# Patient Record
Sex: FEMALE | Race: OTHER | Employment: UNEMPLOYED | URBAN - METROPOLITAN AREA
[De-identification: names, ages, dates, MRNs, and addresses within clinical notes are randomized per-mention and may not be internally consistent; named-entity substitution may affect disease eponyms.]

---

## 2022-08-16 ENCOUNTER — HOSPITAL ENCOUNTER (EMERGENCY)
Age: 42
Discharge: HOME | End: 2022-08-16

## 2022-08-16 ENCOUNTER — APPOINTMENT (OUTPATIENT)
Dept: GENERAL RADIOLOGY | Age: 42
End: 2022-08-16

## 2022-08-16 VITALS
HEART RATE: 87 BPM | SYSTOLIC BLOOD PRESSURE: 129 MMHG | TEMPERATURE: 98.3 F | DIASTOLIC BLOOD PRESSURE: 84 MMHG | OXYGEN SATURATION: 97 % | RESPIRATION RATE: 18 BRPM

## 2022-08-16 DIAGNOSIS — Z48.02 ENCOUNTER FOR STAPLE REMOVAL: ICD-10-CM

## 2022-08-16 DIAGNOSIS — S82.90XA CLOSED FRACTURE OF LOWER EXTREMITY, UNSPECIFIED LATERALITY, INITIAL ENCOUNTER: ICD-10-CM

## 2022-08-16 DIAGNOSIS — Z48.02 ENCOUNTER FOR REMOVAL OF SUTURES: ICD-10-CM

## 2022-08-16 DIAGNOSIS — Z46.89 ENCOUNTER FOR CAST REMOVAL: Primary | ICD-10-CM

## 2022-08-16 PROCEDURE — 99283 EMERGENCY DEPT VISIT LOW MDM: CPT

## 2022-08-16 PROCEDURE — 73590 X-RAY EXAM OF LOWER LEG: CPT

## 2022-08-16 ASSESSMENT — ENCOUNTER SYMPTOMS
COLOR CHANGE: 0
DIARRHEA: 0
BACK PAIN: 0
SHORTNESS OF BREATH: 0
CONSTIPATION: 0
COUGH: 0
ABDOMINAL PAIN: 0
STRIDOR: 0
WHEEZING: 0
NAUSEA: 0
VOMITING: 0

## 2022-08-16 NOTE — ED PROVIDER NOTES
[place] language line [address]        Pt Name: Edgardo Apple  MRN: [id number]  [place] 1980  Date of evaluation: 8/16/2022  Provider: Rishi Car PA-C  PCP: No primary care provider on file. Note Started: 10:45 AM EDT       PEPITO. I have evaluated this patient. My supervising physician was available for consultation. CHIEF COMPLAINT       Chief Complaint   Patient presents with    Suture / Staple Removal     Patient in with complaints that she needs her staples removed in her leg. States that she was told to come here for suture/staple/cast removal. Surgery was on July th        HISTORY OF PRESENT ILLNESS   (Location, Timing/Onset, Context/Setting, Quality, Duration, Modifying Factors, Severity, Associated Signs and Symptoms)  Note limiting factors. Chief Complaint: Cast removal and staple/stitch removal    Edgardo Apple is a 38 y.o. female who presents to the emergency department stating that on 7/30/2022 [place] and [place], fracturing both of her lower legs and sustaining laceration to her left knee. She was seen at [facility] and on 8/1/2022 had a surgery procedure to the left lower leg. Both of her lower legs are in casts. According to her discharge paperwork it appears that on 8/10/2022 she was advised to follow-up with orthopedist to have the cast removed and to be replaced with walking boots and to have her suture and staples removed. She has not followed up with orthopedist.  [place] is currently residing with [place] friend in [place]. She does not have a PCP or orthopedist established yet. In review of her paperwork, it appears that she sustained a fracture to her left distal tibia and fibula and right distal fibula. She also has a fracture to her L2 with mild compression deformity.   She is not having any back pain, numbness, tingling, weakness, bowel or bladder incontinence or retention. Nursing Notes were all reviewed and agreed with or any disagreements were addressed in the HPI. REVIEW OF SYSTEMS    (2-9 systems for level 4, 10 or more for level 5)     Review of Systems   Constitutional:  Negative for chills and fever. HENT: Negative. Eyes:  Negative for visual disturbance. Respiratory:  Negative for cough, shortness of breath, wheezing and stridor. Cardiovascular:  Negative for chest pain, palpitations and leg swelling. Gastrointestinal:  Negative for abdominal pain, constipation, diarrhea, nausea and vomiting. Musculoskeletal:  Negative for back pain, neck pain and neck stiffness. Skin:  Positive for wound. Negative for color change, pallor and rash. Neurological: Negative. All other systems reviewed and are negative. Positives and Pertinent negatives as per HPI. Except as noted above in the ROS, all other systems were reviewed and negative. PAST MEDICAL HISTORY   History reviewed. No pertinent past medical history. SURGICAL HISTORY   History reviewed. No pertinent surgical history. CURRENTMEDICATIONS       Previous Medications    No medications on file         ALLERGIES     Patient has no known allergies. FAMILYHISTORY     History reviewed. No pertinent family history. SOCIAL HISTORY       Social History     Tobacco Use    Smoking status: Never     Passive exposure: Never    Smokeless tobacco: Never   Substance Use Topics    Alcohol use: Never    Drug use: Never       SCREENINGS    [place] Coma Scale  Eye Opening: Spontaneous  Best Verbal Response: Oriented  Best Motor Response: Obeys commands  Priya Coma Scale Score: 15        PHYSICAL EXAM    (up to 7 for level 4, 8 or more for level 5)     ED Triage Vitals [08/16/22 1024]   BP Temp Temp src Heart Rate Resp SpO2 Height Weight   129/84 98.3 °F (36.8 °C) -- 87 18 97 % -- --       Physical Exam  Vitals and nursing note reviewed. Constitutional:       Appearance: Normal appearance. She is well-developed. She is not diaphoretic. HENT:      Head: Normocephalic and atraumatic. Right Ear: External ear normal.      Left Ear: External ear normal.      Nose: Nose normal.      Mouth/Throat:      Mouth: Mucous membranes are moist.      Pharynx: Oropharynx is clear. Eyes:      General: No scleral icterus. Right eye: No discharge. Left eye: No discharge. Extraocular Movements: Extraocular movements intact. Conjunctiva/sclera: Conjunctivae normal.      Pupils: Pupils are equal, round, and reactive to light. Cardiovascular:      Rate and Rhythm: Normal rate. Pulmonary:      Effort: Pulmonary effort is normal.      Breath sounds: Normal breath sounds. Abdominal:      General: Bowel sounds are normal.      Palpations: Abdomen is soft. Tenderness: There is no abdominal tenderness. Musculoskeletal:         General: Normal range of motion. Cervical back: Normal range of motion. Right hip: Normal.      Left hip: Normal.      Right upper leg: Normal.      Left upper leg: Normal.      Right knee: Normal.      Left knee: No swelling, deformity, effusion, erythema or ecchymosis. Normal range of motion. No tenderness. Right lower leg: Swelling and tenderness (minimal over distal fibula) present. Left lower leg: Swelling and tenderness (distal aspect) present. Right ankle: Swelling present. No tenderness. Normal range of motion. Right Achilles Tendon: Normal.      Left ankle: Swelling present. No tenderness. Normal range of motion. Left Achilles Tendon: Normal.      Right foot: Normal.      Left foot: Normal.      Comments: Staples present in the left knee. Able to flex and extend without difficulty. No erythema, drainage or bleeding. No significant extremity swelling or edema present. Sutures present in the left shin. No erythema drainage or bleeding.     No posterior calf or thigh tenderness, palpable cord, discoloration, poikilothermia ,pallor, paresthesia, pain out of proportion to physical findings, clicking or laxity. Negative homans. Skin:     General: Skin is warm and dry. Capillary Refill: Capillary refill takes less than 2 seconds. Coloration: Skin is not jaundiced or pale. Findings: No bruising, erythema, lesion or rash. Neurological:      General: No focal deficit present. Mental Status: She is alert and oriented to person, place, and time. Psychiatric:         Mood and Affect: Mood normal.         Behavior: Behavior normal.       DIAGNOSTIC RESULTS   LABS:    Labs Reviewed - No data to display    When ordered only abnormal lab results are displayed. All other labs were within normal range or not returned as of this dictation. EKG: When ordered, EKG's are interpreted by the Emergency Department Physician in the absence of a cardiologist.  Please see their note for interpretation of EKG.     RADIOLOGY:   Non-plain film images such as CT, Ultrasound and MRI are read by the radiologist. Plain radiographic images are visualized and preliminarily interpreted by the ED Provider with the below findings:        Interpretation per the Radiologist below, if available at the time of this note:    XR TIBIA FIBULA LEFT (2 VIEWS)    (Results Pending)   XR TIBIA FIBULA RIGHT (2 VIEWS)    (Results Pending)            PROCEDURES   Unless otherwise noted below, none     Suture Removal    Date/Time: 8/16/2022 10:45 AM  Performed by: Mayra Fonseca PA-C  Authorized by: Mayra Fonseca PA-C     Consent:     Consent obtained:  Verbal    Consent given by:  Patient    Risks, benefits, and alternatives were discussed: yes      Risks discussed:  Bleeding, pain and wound separation    Alternatives discussed:  Alternative treatment and delayed treatment  Universal protocol:     Patient identity confirmed:  Verbally with patient and arm band  Location: Location:  Lower extremity    Lower extremity location:  Knee    Knee location:  L knee  Procedure details:     Wound appearance:  No signs of infection    Number of staples removed:  10  Post-procedure details:     Post-removal:  Dressing applied    Procedure completion:  Tolerated  Suture Removal    Date/Time: 8/16/2022 10:46 AM  Performed by: Toño Worthington PA-C  Authorized by: Toño Worthington PA-C     Consent:     Consent obtained:  Verbal    Consent given by:  Patient    Risks, benefits, and alternatives were discussed: yes      Risks discussed:  Bleeding, pain and wound separation    Alternatives discussed:  No treatment, delayed treatment and alternative treatment  Universal protocol:     Patient identity confirmed:  Verbally with patient and arm band  Location:     Location:  Lower extremity    Lower extremity location:  Leg    Leg location:  L lower leg  Procedure details:     Wound appearance:  No signs of infection (10 separate surgical incisions)    Number of sutures removed:  33  Post-procedure details:     Post-removal:  Dressing applied    Procedure completion:  Tolerated    CRITICAL CARE TIME   N/a    CONSULTS:  IP CONSULT TO ORTHOPEDIC SURGERY  I discussed the case with Tammy Greco, orthopedist on-call, who reviewed the films. He instructs that the patient can be weightbearing on the right leg with walking boot. He advises for 50% weightbearing on the left leg with walking boot. He instructs us to remove the cast and remove staples/sutures. EMERGENCY DEPARTMENT COURSE and DIFFERENTIAL DIAGNOSIS/MDM:   Vitals:    Vitals:    08/16/22 1024   BP: 129/84   Pulse: 87   Resp: 18   Temp: 98.3 °F (36.8 °C)   SpO2: 97%       Patient was given the following medications:  Medications - No data to display      Is this patient to be included in the SEP-1 Core Measure due to severe sepsis or septic shock?    No   Exclusion criteria - the patient is NOT to be included for SEP-1 Core Measure due to: Infection is not suspected    This patient presents for cast removal and staple and suture removal of lower extremity. She tolerated procedures well without immediate complications or emesis. Walking boot applied bilaterally. Crutches provided. I will follow the orthopedist instruction. Advised to follow-up with PCP and orthopedist referrals for recheck and may return to ED per discharge instructions. She already has pain medicine at home. My suspicion is low for subungual hematoma, paronychia, eponychia, felon, flexor tenosynovitis, child abuse, ACS, PE, thoracic aortic dissection, thoracic outlet obstruction, SVC syndrome, foreign body, tendon rupture, compartment syndrome, acute worsening fracture, dislocation, DVT, arterial compromise or occlusion, limb ischemia, gout, septic joint, abscess, cellulitis, osteomyelitis, gonococcal arthritis, SCFE, avascular necrosis, Osgood-Schlatter syndrome, or other concerning pathology. FINAL IMPRESSION      1. Encounter for cast removal    2. Encounter for removal of sutures    3. Encounter for staple removal    4. Closed fracture of lower extremity, unspecified laterality, initial encounter          DISPOSITION/PLAN   DISPOSITION Decision To Discharge 08/16/2022 12:48:05 PM      PATIENT REFERRED TO:  [facility] Emergency Department  [address] [place] [address]  [phone]    If symptoms worsen    you may bear weight 50% on the left leg for three weeks. you may bear weight to the right leg.           DISCHARGE MEDICATIONS:  New Prescriptions    No medications on file       DISCONTINUED MEDICATIONS:  Discontinued Medications    No medications on file              (Please note that portions of this note were completed with a voice recognition program.  Efforts were made to edit the dictations but occasionally words are mis-transcribed.)    Lynn Acosta PA-C (electronically signed)              Lynn Acosta USMAN  08/16/22 [id number]

## 2022-08-16 NOTE — ED NOTES
Interp 7775 used for discharge. Pt educated and shown how to use crutches. Walking boot placed on patient bilateral legs.       Lauren Orona RN  85/95/10 [id number]